# Patient Record
Sex: MALE | Race: AMERICAN INDIAN OR ALASKA NATIVE
[De-identification: names, ages, dates, MRNs, and addresses within clinical notes are randomized per-mention and may not be internally consistent; named-entity substitution may affect disease eponyms.]

---

## 2019-07-16 ENCOUNTER — HOSPITAL ENCOUNTER (EMERGENCY)
Dept: HOSPITAL 5 - ED | Age: 79
LOS: 1 days | End: 2019-07-17
Payer: MEDICARE

## 2019-07-16 DIAGNOSIS — I25.2: ICD-10-CM

## 2019-07-16 DIAGNOSIS — Z79.82: ICD-10-CM

## 2019-07-16 DIAGNOSIS — Z79.899: ICD-10-CM

## 2019-07-16 DIAGNOSIS — Z79.01: ICD-10-CM

## 2019-07-16 DIAGNOSIS — I48.91: ICD-10-CM

## 2019-07-16 DIAGNOSIS — I46.9: Primary | ICD-10-CM

## 2019-07-16 DIAGNOSIS — I10: ICD-10-CM

## 2019-07-16 DIAGNOSIS — Z98.890: ICD-10-CM

## 2019-07-16 PROCEDURE — 99285 EMERGENCY DEPT VISIT HI MDM: CPT

## 2019-07-16 PROCEDURE — 92950 HEART/LUNG RESUSCITATION CPR: CPT

## 2019-07-16 NOTE — EMERGENCY DEPARTMENT REPORT
ED CPR HPI





- General


Chief Complaint: Cardiac Arrest/CPR


Stated Complaint: CARDIAC ARREST


Time Seen by Provider: 19 21:04


Source: EMS


Mode of arrival: Stretcher


Limitations: Other





- History of Present Illness


Initial Comments: 





79-year-old -American male with unknown past medical problems who is 

presenting cardiac arrest.  Patient has had extended down time according to 

paramedics.  Patient has been in asystole PA for the past 55 minutes.  Stephen A 

where was obtained prior to arrival the patient was given 3 rounds of 

epinephrine.  Patient has had continuous CPR.  She had no return of spontaneous 

circulation in route.





- Related Data


                                Home Medications











 Medication  Instructions  Recorded  Confirmed  Last Taken


 


Aspirin 325 mg PO QDAY 13 08:00


 


Atorvastatin Calcium [Lipitor] 20 mg PO 13 21:00


 


Carvedilol [Coreg] 12.5 mg PO BID 13 09:00


 


Tuberculin,Purif.prot.deriv. 5 unit ID 13 15:00





[Aplisol]    


 


Warfarin [Coumadin] 2 mg PO QDAY 13 21:00











                                    Allergies











Allergy/AdvReac Type Severity Reaction Status Date / Time


 


No Known Allergies Allergy   Unverified 13 20:02














ED Review of Systems


ROS: 


Stated complaint: CARDIAC ARREST


Other details as noted in HPI





Comment: Unobtainable due to pts medical conditions





ED Past Medical Hx





- Past Medical History


Hx Hypertension: Yes


Hx Heart Attack/AMI: Yes


Hx Pulmonary Embolism: Yes


Additional medical history: AAA, atrial flutter





- Surgical History


Hx Open Heart Surgery: Yes


Additional Surgical History: Endovascular AAA repair on 10/22/2013





- Social History


Smoking Status: Never Smoker


Substance Use Type: None





- Medications


Home Medications: 


                                Home Medications











 Medication  Instructions  Recorded  Confirmed  Last Taken  Type


 


Aspirin 325 mg PO QDAY 13 08:00 History


 


Atorvastatin Calcium [Lipitor] 20 mg PO 13 21:00 History


 


Carvedilol [Coreg] 12.5 mg PO BID 13 09:00 History


 


Tuberculin,Purif.prot.deriv. 5 unit ID 13 15:00 History





[Aplisol]     


 


Warfarin [Coumadin] 2 mg PO QDAY 13 21:00 History














ED Physical Exam





- General


Limitations: Other


General appearance: obtunded





- Head


Head exam: Present: atraumatic, normocephalic





- Eye


Eye exam: Present: other





- ENT


ENT exam: Present: mucous membranes dry (pupils are fixed and dilated)





- Neck


Neck exam: Present: normal inspection





- Respiratory


Respiratory exam: Absent: normal lung sounds bilaterally, respiratory distress 

(patient in respiratory failure and has no spontaneous breath sounds)





- Cardiovascular


Cardiovascular Exam: Absent: regular rate, normal rhythm, normal heart sounds 

(no spontaneous heart tones are appreciated), systolic murmur, diastolic murmur,

 rubs, gallop





- GI/Abdominal


GI/Abdominal exam: Present: soft, normal bowel sounds





- Rectal


Rectal exam: Present: deferred





- Extremities Exam


Extremities exam: Present: normal inspection





- Back Exam


Back exam: Present: normal inspection





- Neurological Exam


Neurological exam: Absent: alert





- Psychiatric


Psychiatric exam: Absent: normal affect, normal mood





- Skin


Skin exam: Present: warm, dry, intact, normal color.  Absent: rash





ED Medical Decision Making





- Medical Decision Making





Patient's had a prolonged downtime with CPR.  Patient was pronounced at 2158.  

Family is been notified as well. 


Critical care attestation.: 


If time is entered above; I have spent that time in minutes in the direct care 

of this critically ill patient, excluding procedure time.








ED Disposition


Clinical Impression: 


 Cardiac arrest





Disposition: [id number] 


Is pt being admited?: No


Does the pt Need Aspirin: No


Condition: Stable


Time of Disposition: 22:22